# Patient Record
Sex: MALE | Race: OTHER | URBAN - METROPOLITAN AREA
[De-identification: names, ages, dates, MRNs, and addresses within clinical notes are randomized per-mention and may not be internally consistent; named-entity substitution may affect disease eponyms.]

---

## 2024-03-28 ENCOUNTER — OFFICE VISIT (OUTPATIENT)
Dept: SURGERY | Facility: CLINIC | Age: 49
End: 2024-03-28

## 2024-03-28 VITALS — HEART RATE: 62 BPM | SYSTOLIC BLOOD PRESSURE: 131 MMHG | DIASTOLIC BLOOD PRESSURE: 83 MMHG

## 2024-03-28 DIAGNOSIS — N40.1 BENIGN PROSTATIC HYPERPLASIA WITH WEAK URINARY STREAM: ICD-10-CM

## 2024-03-28 DIAGNOSIS — R31.0 GROSS HEMATURIA: ICD-10-CM

## 2024-03-28 DIAGNOSIS — R39.12 BENIGN PROSTATIC HYPERPLASIA WITH WEAK URINARY STREAM: ICD-10-CM

## 2024-03-28 DIAGNOSIS — R82.90 FOUL SMELLING URINE: ICD-10-CM

## 2024-03-28 DIAGNOSIS — R35.1 BENIGN PROSTATIC HYPERPLASIA WITH NOCTURIA: ICD-10-CM

## 2024-03-28 DIAGNOSIS — R35.0 URINARY FREQUENCY: ICD-10-CM

## 2024-03-28 DIAGNOSIS — Z80.42 FAMILY HISTORY OF PROSTATE CANCER IN FATHER: ICD-10-CM

## 2024-03-28 DIAGNOSIS — R30.0 DYSURIA: Primary | ICD-10-CM

## 2024-03-28 DIAGNOSIS — N40.1 BENIGN PROSTATIC HYPERPLASIA WITH NOCTURIA: ICD-10-CM

## 2024-03-28 DIAGNOSIS — Z80.42 FAMILY HISTORY OF PROSTATE CANCER: ICD-10-CM

## 2024-03-28 LAB
BILIRUB UR QL: NEGATIVE
COLOR UR: YELLOW
GLUCOSE UR-MCNC: NORMAL MG/DL
KETONES UR-MCNC: NEGATIVE MG/DL
LEUKOCYTE ESTERASE UR QL STRIP.AUTO: 500
NITRITE UR QL STRIP.AUTO: NEGATIVE
PH UR: 6 [PH] (ref 5–8)
PROT UR-MCNC: 100 MG/DL
RBC #/AREA URNS AUTO: >10 /HPF
SP GR UR STRIP: >1.03 (ref 1–1.03)
UROBILINOGEN UR STRIP-ACNC: NORMAL
WBC #/AREA URNS AUTO: >50 /HPF
WBC CLUMPS UR QL AUTO: PRESENT /HPF

## 2024-03-28 PROCEDURE — 99205 OFFICE O/P NEW HI 60 MIN: CPT | Performed by: PHYSICIAN ASSISTANT

## 2024-03-28 RX ORDER — LEVOTHYROXINE SODIUM 0.03 MG/1
25 TABLET ORAL DAILY
COMMUNITY
Start: 2023-08-25

## 2024-03-28 RX ORDER — TAMSULOSIN HYDROCHLORIDE 0.4 MG/1
0.4 CAPSULE ORAL DAILY
Qty: 90 CAPSULE | Refills: 3 | Status: SHIPPED | OUTPATIENT
Start: 2024-03-28 | End: 2024-03-28

## 2024-03-28 RX ORDER — LISINOPRIL 20 MG/1
20 TABLET ORAL DAILY
COMMUNITY
Start: 2023-11-28

## 2024-03-28 RX ORDER — ROSUVASTATIN CALCIUM 20 MG/1
20 TABLET, COATED ORAL NIGHTLY
COMMUNITY
Start: 2023-11-28

## 2024-03-28 RX ORDER — TAMSULOSIN HYDROCHLORIDE 0.4 MG/1
0.4 CAPSULE ORAL DAILY
Qty: 90 CAPSULE | Refills: 0 | Status: SHIPPED | OUTPATIENT
Start: 2024-03-28

## 2024-03-28 NOTE — PROGRESS NOTES
Subjective:     Patient ID: Noel Jules is a 48 year old male.    Urinary Frequency   Associated symptoms include frequency.     Mr. Jules is a 48 year old  male who presents to clinic today at the request of Dr. Currie for noctura.  needed for visit.   The patient reports that he has had urinary incontinence. Urinary frequency. Penile pain when he urinates.  The patient reports he needs to urinate every hour. He works in hot conditions. He drinks lots of water.   The patient reports he will occasionally have accidents. He reports that he has to strain to urinate.   No fever, no chills.   He reports 3 months ago he had gross hematuria. He reports currently urine is orange in color and has a foul smell.   The patient has not had a prostate exam completed.   IPSS score is 22 (5/4/-/5/-/5/3)  QOL is 6, terrible.   Father had history of prostate cancer, he was 70 when they found it had advanced and it was in the bones.     The patient is a social smoker.   The patient drinks alcohol, started drinking at 19. 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   No history of chemical exposure.       History/Other:   Review of Systems   Genitourinary:  Positive for frequency.   A comprehensive 10 point review of systems was completed.  Pertinent positives and negatives noted in the the HPI.   Current Outpatient Medications   Medication Sig Dispense Refill    rosuvastatin 20 MG Oral Tab Take 1 tablet (20 mg total) by mouth nightly.      metFORMIN 500 MG Oral Tab Take 1 tablet (500 mg total) by mouth 2 (two) times daily with meals.      lisinopril 20 MG Oral Tab Take 1 tablet (20 mg total) by mouth daily.      levothyroxine 25 MCG Oral Tab Take 1 tablet (25 mcg total) by mouth daily.      tamsulosin 0.4 MG Oral Cap Take 1 capsule (0.4 mg total) by mouth daily. 90 capsule 3     Allergies:Not on File    No past medical history on file.   No past surgical history on  file.   No family history on file.   Social History:   Social History     Socioeconomic History    Marital status: Single        Objective:   Physical Exam  Constitutional:       Appearance: Normal appearance.   HENT:      Head: Normocephalic and atraumatic.   Eyes:      General: No scleral icterus.     Conjunctiva/sclera: Conjunctivae normal.   Abdominal:      General: There is no distension.      Palpations: Abdomen is soft.      Tenderness: There is no abdominal tenderness. There is no right CVA tenderness or left CVA tenderness.   Genitourinary:     Prostate: Normal.      Comments: PVR: 114 mL   Musculoskeletal:         General: Normal range of motion.   Skin:     General: Skin is warm and dry.   Neurological:      Mental Status: He is alert and oriented to person, place, and time.   Psychiatric:         Mood and Affect: Mood normal.         Behavior: Behavior normal.         Assessment & Plan:   1. Dysuria    2. Gross hematuria    3. Urinary frequency    4. Foul smelling urine    5. Family history of prostate cancer    6. Family history of prostate cancer in father      1) Dysuria   - will send off UA and urine culture   2) Gross hematuria   I had a lengthy discussion with Noel Jules regarding the finding of gross hematuria.  We went over the relevant anatomy of the  tract as well as the different possible etiologies and differential diagnoses, including benign causes such as infections, stones, recent instrumentation of the genitourinary tract, or benign enlargement of the prostate (in males).  We also discussed more serious potential causes including malignancy or tumors in the upper or lower urinary tracts.    I then discussed the proposed workup for gross hematuria.  This includes a voided urine sample for cytology, imaging in the form of a CT-Urogram to evaluate the upper urinary tracts, as well as a cystoscopy to evaluate the bladder and urethra.    Further management and recommendations will be  based on the results of the workup as outlined above. The patient wishes to proceed with the workup as discussed.  They asked appropriate questions all of which were answered to their satisfaction.     - Urine cytology, CT urogram and Cytoscopy need to be completed.     3) Urinary frequency/ BPH  - could be related to BPH will start with a trial of tamsulosin     4) Family history of prostate cancer   - PSA screen.     Patient will follow up with us for cystoscopy and discuss results.     Orders Placed This Encounter   Procedures    Urinalysis, Routine    PSA Screen [E]    Cytology, fluids    Urine Culture, Routine       Meds This Visit:  Requested Prescriptions     Signed Prescriptions Disp Refills    tamsulosin 0.4 MG Oral Cap 90 capsule 3     Sig: Take 1 capsule (0.4 mg total) by mouth daily.       Imaging & Referrals:  CT UROGRAM(W+WO) W/3D(CPT=74178/60879)     Mikhail Jeffery PA-C   March 28, 2024

## 2024-03-29 ENCOUNTER — TELEPHONE (OUTPATIENT)
Dept: SURGERY | Facility: CLINIC | Age: 49
End: 2024-03-29

## 2024-03-29 LAB — NON GYNE INTERPRETATION: NEGATIVE

## 2024-03-29 RX ORDER — NITROFURANTOIN 25; 75 MG/1; MG/1
100 CAPSULE ORAL 2 TIMES DAILY
Qty: 14 CAPSULE | Refills: 0 | Status: SHIPPED | OUTPATIENT
Start: 2024-03-29 | End: 2024-04-05

## 2024-03-29 RX ORDER — NITROFURANTOIN 25; 75 MG/1; MG/1
100 CAPSULE ORAL 2 TIMES DAILY
Qty: 14 CAPSULE | Refills: 0 | Status: SHIPPED | OUTPATIENT
Start: 2024-03-29 | End: 2024-03-29

## 2024-03-29 NOTE — TELEPHONE ENCOUNTER
----- Message from Mikhail Jeffery PA-C sent at 3/29/2024  2:20 PM CDT -----  Can we please notify the patient that his urine culture came back positive for bacteria. I have called in antibiotics.   His urine cytology is negative. No signs of cancer cells floating in urine.   Continue with plans as we discussed.    Thank you,   Mikhail

## 2024-03-29 NOTE — TELEPHONE ENCOUNTER
HEATHER with . Then called his daughter Cindy, who is his emergency contact. I gave her Amal's message and she asked me to change the pharmacy to [place] and remove [place].    I told Cindy that I would change the prescription and also let her know that her father has an appointment for a cystoscopy in April.    Cindy verbalized her understanding.

## 2024-03-29 NOTE — TELEPHONE ENCOUNTER
Mikhail sent below message.     Please avise.         Mikhail Jeffery PA-C  3/29/2024  2:20 PM CDT       Can we please notify the patient that his urine culture came back positive for bacteria. I have called in antibiotics.  His urine cytology is negative. No signs of cancer cells floating in urine.  Continue with plans as we discussed.     Thank you,  Mikhail

## 2024-04-19 ENCOUNTER — TELEPHONE (OUTPATIENT)
Dept: SURGERY | Facility: CLINIC | Age: 49
End: 2024-04-19

## 2024-04-19 ENCOUNTER — PROCEDURE (OUTPATIENT)
Dept: SURGERY | Facility: CLINIC | Age: 49
End: 2024-04-19

## 2024-04-19 VITALS — DIASTOLIC BLOOD PRESSURE: 91 MMHG | HEART RATE: 55 BPM | SYSTOLIC BLOOD PRESSURE: 150 MMHG

## 2024-04-19 DIAGNOSIS — R31.0 GROSS HEMATURIA: ICD-10-CM

## 2024-04-19 DIAGNOSIS — R35.0 URINARY FREQUENCY: ICD-10-CM

## 2024-04-19 DIAGNOSIS — N47.1 PHIMOSIS: Primary | ICD-10-CM

## 2024-04-19 DIAGNOSIS — R30.0 DYSURIA: Primary | ICD-10-CM

## 2024-04-19 DIAGNOSIS — R82.90 FOUL SMELLING URINE: ICD-10-CM

## 2024-04-19 PROCEDURE — 99213 OFFICE O/P EST LOW 20 MIN: CPT | Performed by: UROLOGY

## 2024-04-19 NOTE — PROGRESS NOTES
Noel Jules is a 48 year old male.    HPI:   No chief complaint on file.      48-year-old male seen by physician assistant Mikhail 2024 for multiple urologic complaints including nocturia, incontinence, urinary frequency, penile pain when he urinates, discomfort when he tries to have intercourse.  Evaluation included noting that he had gross hematuria.  He was referred for his urine cytology which was negative.  Urine culture showed Aerococcus and he was treated appropriately with oral antibiotics.  A CT urogram was ordered but not yet done.  I stressed the importance of obtaining this as soon as possible and he understands.  He presents today for office cystoscopy to complete his workup.  No new reported complaints.      HISTORY:  No past medical history on file.   No past surgical history on file.   No family history on file.   Social History:   Social History     Socioeconomic History    Marital status: Single   Tobacco Use    Smoking status: Some Days     Types: Cigarettes     Social Determinants of Health     Financial Resource Strain: Not on File (10/6/2022)    Received from [facility]     Financial Resource Strain     Financial Resource Strain: 0   Food Insecurity: Not on File (10/6/2022)    Received from [facility]     Food Insecurity     Food: 0   Transportation Needs: Not on File (10/6/2022)    Received from [facility]     Transportation Needs     Transportation: 0   Physical Activity: Not on File (10/6/2022)    Received from [facility]     Physical Activity     Physical Activity: 0   Stress: Not on File (10/6/2022)    Received from [facility]     Stress     Stress: 0   Social Connections: Not on File (10/6/2022)    Received from [facility]     Social Connections     Social Connections and Isolation: 0   Housing Stability: Not on File (10/6/2022)    Received from [facility]     Housing Stability     Housin        Medications (Active prior to today's visit):  Current  Outpatient Medications   Medication Sig Dispense Refill    rosuvastatin 20 MG Oral Tab Take 1 tablet (20 mg total) by mouth nightly.      metFORMIN 500 MG Oral Tab Take 1 tablet (500 mg total) by mouth 2 (two) times daily with meals.      lisinopril 20 MG Oral Tab Take 1 tablet (20 mg total) by mouth daily.      levothyroxine 25 MCG Oral Tab Take 1 tablet (25 mcg total) by mouth daily.      tamsulosin 0.4 MG Oral Cap Take 1 capsule (0.4 mg total) by mouth daily. 90 capsule 0       Allergies:  Not on File      ROS:       PHYSICAL EXAM:   On physical exam the patient has severe phimosis and a pinpoint opening to his foreskin.  This is likely the etiology of the patient's symptoms as described above.     ASSESSMENT/PLAN:   Assessment   Encounter Diagnoses   Name Primary?    Dysuria Yes    Gross hematuria     Urinary frequency     Foul smelling urine        Recommend:  - Discussed findings with the patient.  Recommended circumcision and cystoscopy under anesthesia to rule out any urethral or bladder abnormalities.  I stressed the importance of completing a CT urogram as discussed.  The patient will be contacted by my office to schedule.  Risks and possible side effects were reviewed with him.         Orders This Visit:  No orders of the defined types were placed in this encounter.      Meds This Visit:  Requested Prescriptions      No prescriptions requested or ordered in this encounter       Imaging & Referrals:  None     4/19/2024  Gardenia Bragg MD

## 2024-04-19 NOTE — TELEPHONE ENCOUNTER
Jus Ludwig,  Please schedule this patient as follows:  Diagnosis: Phimosis, gross hematuria  Procedure: Circumcision, cystoscopy, possible biopsy  Site: [facility]  Time: 1 hour  Anesthesia: General  Antibiotics on-call to the operating room: Ancef 2 g IV on-call to the OR  Preoperative labs: CBC, BMP, urine culture, EKG

## 2024-04-19 NOTE — PROGRESS NOTES
-Pt given printout of CT Urogram order; highlighted phone number to schedule test & Call-Center number to contact Urology, if he has additional questions.   -He is aware imaging needs to be completed prior to surgery.  -Encounter complete.

## 2024-04-19 NOTE — TELEPHONE ENCOUNTER
Spoke with patient, scheduled Circumcision, cystoscopy, possible biopsy, Wednesday 05/15/2024, went over pre-op instructions, will mail to patient' home, once received patient will call office, please transfer call to [phone].

## 2024-04-26 ENCOUNTER — TELEPHONE (OUTPATIENT)
Dept: SURGERY | Facility: CLINIC | Age: 49
End: 2024-04-26

## 2024-04-26 NOTE — TELEPHONE ENCOUNTER
Patient is scheduled for CT urogram at [facility] on 5/6 and prior auth is needed, please call [phone] for any questions. Thank you.

## 2024-04-26 NOTE — TELEPHONE ENCOUNTER
Spoke with patient access representative and informed him that our department is only able to obtain prior authorization for this institution. I advised him that if patient is scheduled with [facility] our managed care department will obtain prior auth if needed.

## 2024-05-14 NOTE — DISCHARGE INSTRUCTIONS
No sexual activity for 1 month.  Please make an appointment to see me in the office in 1 month  Please apply Neosporin or bacitracin or triple antibiotic ointment to the incision starting tomorrow for 6 days  No swimming or bathing for at least 4 weeks  May shower tomorrow     CIRUGIA AMBULATORIA: INSTRUCCIONES DESPUES DE KENDRA RECIBIDO ANESTESIA  Debido a la Anestesia y a las medicamentos que se le aplicaron grabiel la cirugia, cesar reflejos y capacidaddiscernimiento pueden verse afectados. Tambien podria tener un poco de mareo.Aparte de siguir las precauciones de sentico comun, le recomendamos lo siguiente:   El paciente debe estar acompañado por alguien hasta la mañana siguiente.     No maneje ningun vehiculo automotor ni monte bicicleta.     No tome ninguna decision importante stefan por ejemplo firmar de documentos importantes.     No opere herramientas electricas ni electrodomesticos, tales stefan cuchillos electricos, batidoras electricas o serruchos electricos. No zaynab el cesped con cortadoras electricas. No practique deportes.     No paco ejercicio.     Para evitar las nauseas, coma menos de lo normal mas o menos la mitad de lo habitual y/o silverio solo liquidos hasta la manana siguiente. Consulte con sylvester doctor si esta llevando ricky dieta especial.     No tome bebidas alcoholicas, tranquilizantes, pastilles para dormir etc., y verifique con sylvester doctor acerca de cualquier medicamento que an tomando actualmente.     El efecto de la medicación usada en sylvester anestesia habra pasado richie por completo a la medianoche. Por lo tanto, puede reanudar cesar habitos cotidianos en la mañana.     Los adultos deben descansar lo yung posible por las siguientes 24 horas. Los niños deben permanecer en cama lo yung posible por las siguientes 24 horas.     Si se presenta cualquier problema, puede llamar a sylvester propio doctor personal o aceda al centro de [facility].    Si sigue estas instrucciones, se  sentira major y estara mas seguro despues de sylvester cirugia ambulatoria. Sitiene cualquier pregunta, llame al hospital y pida que lo comuniquen con la enfermera de cirugia [phone], extension [phone].    Instrucciones para el ingrid: después de sylvester cirugía   Acaba de someterse a ricky cirugía. Grabiel la cirugía, le administraron un tipo de medicamento llamado anestesia para que esté relajado y no sienta dolor. Después de la cirugía, waylon vez sienta algo de dolor o náuseas. [place] es común. Estos son algunos consejos para sentirse mejor y recuperarse oliver después de la cirugía.   El regreso a casa  Sylvester proveedor de atención médica le enseñará cómo cuidarse cuando regrese a sylvester casa. También responderá cesar preguntas. Pida a un familiar o amigo adulto que lo conduzca a sylvester casa. Grabiel las primeras 24 horas después de la cirugía, siga estas recomendaciones:   No conduzca ni use maquinaria pesada.  No tome decisiones importantes ni firme ningún documento legal.  Adminístrese los medicamentos según las indicaciones.  Evite el consumo de alcohol.  Si es necesario, coordine para que alguien se quede con usted. Esta persona puede vigilar cualquier problema que se presente y lo ayudará a permanecer seguro.  Asegúrese de asistir a todas cesar visitas de control con sylvester proveedor de atención médica. Y descanse después de la cirugía grabiel el tiempo que le indique sylvester proveedor.   Cómo sobrellevar el dolor  Si siente dolor después de la cirugía, los analgésicos lo ayudarán a sentirse mejor. [place] los analgésicos según las indicaciones, antes de que el dolor se intensifique. Además, pregunte a sylvester proveedor de atención médica o al farmacéutico acerca de otras formas de controlar el dolor. Estas podrían incluir aplicar calor o hielo, o hacer ejercicios de relajación. Y siga todas las instrucciones que le dé sylvester cirujano o enfermero.      Cumpla el cronograma de cesar medicamentos.     Consejos para rojas analgésicos  Para aliviar el  dolor lo lee posible, recuerde estos puntos:   Los analgésicos pueden causar malestar estomacal. Tomarlos con un poco de comida puede aliviar viktoria efecto.  La mayoría de los calmantes que se serge por la boca necesitan por lo menos de 20 a 30 minutos para surtir efecto.  No espere hasta que sylvester dolor se vuelva intenso para florencio el analgésico que le indicaron. Intente que el momento en que puede florencio sylvester medicamento coincida con otra actividad. Viktoria podría ser el momento antes de vestirse, lin un paseo o sentarse a la lyman para cenar.  El estreñimiento es un efecto secundario frecuente de algunos analgésicos. Consulte a sylvester proveedor de atención médica antes de usar cualquier medicamento, stefan laxantes o ablandadores de heces, para ayudar a aliviar el estreñimiento. También consulte si es preciso evitar algún tipo de alimento. Florencio mucha cantidad de líquido y comer alimentos stefan frutas y verduras con alto contenido de fibra también puede ser beneficioso. Recuerde que no debe florencio laxantes a menos que sylvester cirujano se los indique.  Mezclar bebidas alcohólicas y analgésicos puede causar mareos y enlentecer sylvester respiración. Y hasta puede ser mortal. No silverio alcohol mientras esté tomando calmantes.  Los analgésicos pueden hacer que tenga reacciones más lentas. No conduzca ni opere maquinaria mientras esté tomando analgésicos.  Sylvester proveedor de atención médica puede indicarle que tome acetaminofén (paracetamol) para ayudar a aliviar el dolor. Pregúntele qué cantidad debe florencio por día. El acetaminofén y otros analgésicos pueden interactuar con cesar medicamentos recetados u otros medicamentos de venta sterling (OTC, por cesar siglas en inglés). Algunos medicamentos recetados contienen acetaminofén y otros ingredientes. Combinar medicamentos recetados y acetaminofén de venta sterling para aliviar el dolor puede provocarle ricky sobredosis accidental. Erica atentamente la etiqueta del envase de cesar medicamentos OTC. [place] lo ayudará a  saber con exactitud la lista de ingredientes, la cantidad que debe rojas y cualquier advertencia. [place] también puede ayudarlo a evitar rojas demasiado acetaminofén. Si tiene preguntas o no entiende la información, pídale a sylvester farmacéutico o proveedor de atención médica que se la explique antes de rojas el medicamento OTC.   Manejo de las náuseas  Algunas personas pueden sentir malestar estomacal (náuseas) después de la cirugía. [place] suele suceder debido a la anestesia, el dolor, los analgésicos, la disminución del movimiento de la comida en el estómago o el estrés de la cirugía. Estos consejos lo ayudarán a manejar las náuseas y a comer alimentos más saludables mientras se recupera. Si seguía un plan alimentario especial antes de la cirugía, pregúntele a sylvester proveedor de atención médica si debe continuarlo mientras se recupera. Consulte con sylvester proveedor cómo debería continuar sylvester alimentación. Esta puede variar según el tipo de cirugía a la que se sometió. Los siguientes consejos generales pueden serle útiles:   No se fuerce a comer. [place] por sylvester cuerpo para saber cuándo comer y qué cantidad.  Comience con líquidos transparentes y sopa. Estos son más fáciles de digerir.  Tan pronto stefan se sienta listo, intente comer alimentos semisólidos. Estos incluyen puré de kavya, puré de manzana y gelatina.  Lentamente, pase a alimentos sólidos. Al principio no coma alimentos grasosos, pesados ni condimentados.  No se fuerce a hacer neo comidas grandes al día. En cambio, coma cantidades pequeñas, jovani con mayor frecuencia.  [place] los analgésicos con ricky pequeña cantidad de alimentos sólidos, stefan galletas saladas o ricky tostada. [place] ayuda a prevenir las náuseas.  Cuándo llamar a sylvester proveedor de atención médica   Llame de inmediato a sylvester proveedor de atención médica si nota alguno de los siguientes síntomas:   Sigue teniendo mucho dolor, o el dolor empeora, después de rojas el medicamento. Puede que el medicamento no sea lo  suficientemente lety. O oliver, puede lan complicaciones de la cirugía.  Se siente demasiado somnoliento, mareado o adormecido. Quizás el medicamento sea demasiado lety.  Tiene efectos secundarios, stefan náuseas o vómitos. Sylvester proveedor de atención médica puede recomendarle rojas otros medicamentos.  Tiene cambios en la piel, stefan sarpullido, picazón o urticaria. [place] puede significar que tiene ricky reacción alérgica. Sylvester proveedor puede recomendarle rojas otros medicamentos.  La incisión tiene un aspecto diferente (por ejemplo, se abre ricky parte).  Tiene sangrado o supuración de líquido de la herida y no le dijeron que eso era esperable.  Fiebre de 100.4 °F (38 °C) o más, o según le indique sylvester proveedor.  Cuándo llamar al 911  Llame al  911  de inmediato si tiene:   Dificultad para respirar  Melissa hinchada    Si tiene apnea del sueño obstructiva   N[place]av la cirugía, le administraron anestesia para que esté cómodo y no sienta dolor. Después de la cirugía, es probable que tenga más ataques de apnea causados por la anestesia y otros medicamentos que le administraron. Los ataques pueden durar más de lo habitual.    En sylvester casa, paco lo siguiente:  Cuando duerma, siga usando sylvester dispositivo de presión positiva continua en las vías respiratorias (CPAP, por cesar siglas en inglés). A menos que sylvester proveedor de atención médica le indique lo contrario, úselo siempre que duerma, ya sea de día o de noche. El dispositivo de CPAP suele usarse para tratar la apnea obstructiva del sueño.  Consulte a sylvester proveedor antes de rojas cualquier analgésico, relajante muscular o sedante. Sylvester proveedor le dará información sobre los peligros de rojas estos medicamentos.  Comuníquese con sylvester proveedor si tiene el sueño demasiado alterado, incluso cuando esté tomando los medicamentos según las instrucciones.  © 0427-8186 [employer]. Todos los derechos reservados. Esta información no pretende sustituir la atención médica profesional.  Sólo sylvester médico puede diagnosticar y tratar un problema de livier.

## 2024-05-15 NOTE — OPERATIVE REPORT
[facility]  part of [facility]    Operative Note     Noel Denis Location: OR   CSN [id number] MRN [id number]   Admission Date 5/15/2024 Operation Date 5/15/2024   Attending Physician Gardenia Bragg MD Operating Physician Gardenia Bragg MD      Preoperative Diagnosis: Phimosis [N47.1]  Gross hematuria [R31.0]     Postoperative Diagnosis: Phimosis [N47.1]Gross hematuria [R31.0]     Procedure Performed:   Circumcision, cystoscopy, possible biopsy     Primary Surgeon: Gardenia Bragg MD      Assistant: None     Surgical Findings: Severe phimosis with the inner prep use essentially adherent completely to the glans of the penis.  Most of it had to be dissected off the glans penis to expose the glans fully.  Unremarkable cystourethroscopy     Anesthesia: General     Complications: None     Implants: * No implants in log *     Specimen: Foreskin to pathology     Drains: None     Condition: Stable after consent was obtained and preoperative antibiotics administered he was brought into the operating room.  Anesthesia was administered and he was placed in the supine position.     Estimated Blood Loss: Blood Output: 10 mL (5/15/2024 12:23 PM)       Summary of Case: The groin was prepped and draped in the usual sterile standard fashion.  I made a dorsal slit to expose the glans penis.  There is severe phimosis and the inner prep use was completely adherent to the glans penis and had to be dissected.  A combination of lubricated forceps, lacrimal probes and Metzenbaum scissors were used to dissect the inner prep use off of the glans penis surface.  Once this was completely exposed the redundant foreskin was incised and removed.  Hemostasis was established.  The edge approximately of the penile skin was sutured using 3-0 Vicryl's to the area just proximal to the coronal sulcus.  Some of that skin was removed so it should reepithelialized with time.  No bleeding was seen.  At this point a flexible  cystoscope was placed per urethra and a cystourethroscopy was done.  No significant abnormalities of the urethra or bladder were detected.  Bacitracin ointment and 4 x 4's were placed as well as an athletic supporter.  He was awakened extubated and taken out to the postanesthesia care in stable condition.  I was present and performed the entirety of his procedure.  There were no perioperative or immediate postop complications.     Gardenia Bragg MD  5/15/2024  12:27 PM

## 2024-05-15 NOTE — INTERVAL H&P NOTE
Pre-op Diagnosis: Phimosis [N47.1]  Gross hematuria [R31.0]    The above referenced H&P was reviewed by Gardenia Bragg MD on 5/15/2024, the patient was examined and no significant changes have occurred in the patient's condition since the H&P was performed.  I discussed with the patient and/or legal representative the potential benefits, risks and side effects of this procedure; the likelihood of the patient achieving goals; and potential problems that might occur during recuperation.  I discussed reasonable alternatives to the procedure, including risks, benefits and side effects related to the alternatives and risks related to not receiving this procedure.  We will proceed with procedure as planned.

## 2024-05-15 NOTE — H&P
Expand All Collapse All    Noel Jules is a 48 year old male.     HPI:   No chief complaint on file.        48-year-old male seen by physician assistant Mikhail March 28, 2024 for multiple urologic complaints including nocturia, incontinence, urinary frequency, penile pain when he urinates, discomfort when he tries to have intercourse.  Evaluation included noting that he had gross hematuria.  He was referred for his urine cytology which was negative.  Urine culture showed Aerococcus and he was treated appropriately with oral antibiotics.  A CT urogram was ordered but not yet done.  I stressed the importance of obtaining this as soon as possible and he understands.  He presents today for office cystoscopy to complete his workup.  No new reported complaints.        HISTORY:  Past Medical History   No past medical history on file.      Past Surgical History   No past surgical history on file.      Family History   No family history on file.      Social History:   Short Social Hx on File   Social History            Socioeconomic History    Marital status: Single   Tobacco Use    Smoking status: Some Days       Types: Cigarettes      Social Determinants of Health           Financial Resource Strain: Not on File (10/6/2022)     Received from [facility]      Financial Resource Strain      Financial Resource Strain: 0   Food Insecurity: Not on File (10/6/2022)     Received from [facility]      Food Insecurity      Food: 0   Transportation Needs: Not on File (10/6/2022)     Received from [facility]      Transportation Needs      Transportation: 0   Physical Activity: Not on File (10/6/2022)     Received from [facility]      Physical Activity      Physical Activity: 0   Stress: Not on File (10/6/2022)     Received from [facility]      Stress      Stress: 0   Social Connections: Not on File (10/6/2022)     Received from [facility]      Social Connections      Social Connections and Isolation: 0   Housing  Stability: Not on File (10/6/2022)     Received from [facility]      [facility] Stability      Housin            Medications (Active prior to today's visit):  Current Medications          Current Outpatient Medications   Medication Sig Dispense Refill    rosuvastatin 20 MG Oral Tab Take 1 tablet (20 mg total) by mouth nightly.        metFORMIN 500 MG Oral Tab Take 1 tablet (500 mg total) by mouth 2 (two) times daily with meals.        lisinopril 20 MG Oral Tab Take 1 tablet (20 mg total) by mouth daily.        levothyroxine 25 MCG Oral Tab Take 1 tablet (25 mcg total) by mouth daily.        tamsulosin 0.4 MG Oral Cap Take 1 capsule (0.4 mg total) by mouth daily. 90 capsule 0            Allergies:  Allergies   Not on File           ROS:   Reviewed and normal     PHYSICAL EXAM:   On physical exam the patient has severe phimosis and a pinpoint opening to his foreskin.  This is likely the etiology of the patient's symptoms as described above.  In no apparent distress  Intact neurologically grossly  Abdomen soft nontender nondistended  Alert and oriented x 3  Phallus and testicle exams unremarkable aside from moderate to severe phimosis.  ASSESSMENT/PLAN:      Assessment       Encounter Diagnoses   Name Primary?    Dysuria Yes    Gross hematuria      Urinary frequency      Foul smelling urine           Recommend:  - Discussed findings with the patient.  Recommended circumcision and cystoscopy under anesthesia to rule out any urethral or bladder abnormalities.  I stressed the importance of completing a CT urogram as discussed.  The patient will be contacted by my office to schedule.  Risks and possible side effects were reviewed with him.              Orders This Visit:  No orders of the defined types were placed in this encounter.        Meds This Visit:  Requested Prescriptions

## 2024-05-15 NOTE — ANESTHESIA PROCEDURE NOTES
Airway  Date/Time: 5/15/2024 11:08 AM  Urgency: elective    Airway not difficult    General Information and Staff    Patient location during procedure: OR  Anesthesiologist: Kathy Fonseca MD  Performed: anesthesiologist   Performed by: Kathy Fonseca MD  Authorized by: Kathy Fonseca MD      Indications and Patient Condition  Indications for airway management: anesthesia  Spontaneous Ventilation: absent  Sedation level: deep  Preoxygenated: yes  Patient position: sniffing  Mask difficulty assessment: 1 - vent by mask  Planned trial extubation    Final Airway Details  Final airway type: supraglottic airway      Successful airway: classic  Size 4       Number of attempts at approach: 1  Number of other approaches attempted: 0

## 2024-05-15 NOTE — ANESTHESIA POSTPROCEDURE EVALUATION
Patient: Noel Denis    Procedure Summary       Date: 05/15/24 Room / Location: [facility] MAIN OR  / [facility] MAIN OR    Anesthesia Start: 1100 Anesthesia Stop: 1250    Procedures:       Circumcision, cystoscopy (Penis)      URINARY BIOPSY (Urethra)      CYSTOSCOPY (Urethra) Diagnosis:       Phimosis      Gross hematuria      (Phimosis [N47.1]Gross hematuria [R31.0])    Surgeons: Gardenia Bragg MD Anesthesiologist: Kathy Fonseca MD    Anesthesia Type: general ASA Status: 2            Anesthesia Type: general    Vitals Value Taken Time   /92 05/15/24 1317   Temp 99.1 °F (37.3 °C) 05/15/24 1247   Pulse 71 05/15/24 1319   Resp 12 05/15/24 1319   SpO2 91 % 05/15/24 1319   Vitals shown include unfiled device data.    [facility] AN Post Evaluation:   Patient Evaluated in PACU  Patient Participation: complete - patient participated  Level of Consciousness: awake and alert  Pain Score: 0  Pain Management: adequate  Airway Patency:patent  Dental exam unchanged from preop  Yes    Nausea/Vomiting: none  Cardiovascular Status: hemodynamically stable  Respiratory Status: spontaneous ventilation and nasal cannula  Postoperative Hydration balanced      Kathy Fonseca MD  5/15/2024 1:20 PM

## 2024-05-15 NOTE — ANESTHESIA PREPROCEDURE EVALUATION
Anesthesia PreOp Note    HPI:     Noel Denis is a 49 year old male who presents for preoperative consultation requested by: Gardenia Bragg MD    Date of Surgery: 5/15/2024    Procedure(s):  Circumcision, cystoscopy, possible biopsy  URINARY BIOPSY  CYSTOSCOPY  Indication: Phimosis [N47.1]  Gross hematuria [R31.0]    Relevant Problems   No relevant active problems       NPO:  Last Liquid Consumption Date: 24  Last Liquid Consumption Time:   Last Solid Consumption Date: 24  Last Solid Consumption Time:   Last Liquid Consumption Date: 24          History Review:  There are no problems to display for this patient.      Past Medical History:    Diabetes (HCC)    Disorder of thyroid    High blood pressure    High cholesterol    Visual impairment       Past Surgical History:   Procedure Laterality Date    Appendectomy      Bowel resection      Umbilical hernia repair         Medications Prior to Admission   Medication Sig Dispense Refill Last Dose    rosuvastatin 20 MG Oral Tab Take 1 tablet (20 mg total) by mouth nightly.   2024    metFORMIN 500 MG Oral Tab Take 1 tablet (500 mg total) by mouth 2 (two) times daily with meals.   2024    lisinopril 20 MG Oral Tab Take 1 tablet (20 mg total) by mouth daily.   2024    levothyroxine 25 MCG Oral Tab Take 1 tablet (25 mcg total) by mouth daily.   2024    tamsulosin 0.4 MG Oral Cap Take 1 capsule (0.4 mg total) by mouth daily. 90 capsule 0 2024    [] nitrofurantoin monohydrate macro (MACROBID) 100 MG Oral Cap Take 1 capsule (100 mg total) by mouth 2 (two) times daily for 14 doses. 14 capsule 0      Current Facility-Administered Medications Ordered in Epic   Medication Dose Route Frequency Provider Last Rate Last Admin    lactated ringers infusion   Intravenous Continuous Gardenia Bragg MD 20 mL/hr at 05/15/24 0915 New Bag at 05/15/24 0915    ceFAZolin (Ancef) 2 g in 20mL IV syringe premix  2 g Intravenous  Once Gardenia Bragg MD         No current [facility]-ordered outpatient medications on file.       No Known Allergies    History reviewed. No pertinent family history.  Social History     Socioeconomic History    Marital status: Single   Tobacco Use    Smoking status: Former     Types: Cigarettes    Smokeless tobacco: Never   Vaping Use    Vaping status: Never Used   Substance and Sexual Activity    Alcohol use: Yes     Comment: SOCIALLY    Drug use: Never       Available pre-op labs reviewed.     Lab Results   Component Value Date    PGLU 110 (H) 05/15/2024          Vital Signs:  Body mass index is 34.01 kg/m².   height is 1.6 m (5' 3\") and weight is 87.1 kg (192 lb). His oral temperature is 98.3 °F (36.8 °C). His blood pressure is 150/92 (abnormal) and his pulse is 73. His respiration is 12 and oxygen saturation is 98%.   Vitals:    05/10/24 1605 05/15/24 0915   BP:  (!) 150/92   Pulse:  73   Resp:  12   Temp:  98.3 °F (36.8 °C)   TempSrc:  Oral   SpO2:  98%   Weight: 86.2 kg (190 lb) 87.1 kg (192 lb)   Height: 1.62 m (5' 3.78\") 1.6 m (5' 3\")        Anesthesia Evaluation      Airway   Mallampati: II  TM distance: >3 FB  Neck ROM: full  Dental - Dentition appears grossly intact     Pulmonary     breath sounds clear to auscultation  Cardiovascular   (+) hypertension    Rhythm: regular  Rate: normal    Neuro/Psych      GI/Hepatic/Renal      Endo/Other    (+) diabetes mellitus  Abdominal   (+) obese    Abdomen: soft.                 Anesthesia Plan:   ASA:  2  Plan:   General  Airway:  ETT and LMA  Post-op Pain Management: IV analgesics  Informed Consent Plan and Risks Discussed With:  Patient  Use of Blood Products Discussed With:  Patient  Discussed plan with:  Surgeon      I have informed Noel Denis and/or legal guardian or family member of the nature of the anesthetic plan, benefits, risks including possible dental damage if relevant, major complications, and any alternative forms of anesthetic  management.   All of the patient's questions were answered to the best of my ability. The patient desires the anesthetic management as planned.  Kathy Fonseca MD  5/15/2024 10:43 AM  Present on Admission:  **None**

## 2024-05-15 NOTE — ANESTHESIA PROCEDURE NOTES
Airway  Date/Time: 5/15/2024 12:09 PM  Urgency: elective    Airway not difficult    General Information and Staff    Patient location during procedure: OR  Anesthesiologist: Kathy Fonseca MD  Performed: anesthesiologist   Performed by: Kathy Fonseca MD  Authorized by: Kathy Fonseca MD      Final Airway Details  Final airway type: endotracheal airway      Successful airway: ETT  Cuffed: yes   Successful intubation technique: Video laryngoscopy  Facilitating devices/methods: intubating stylet  Endotracheal tube insertion site: oral  Blade size: #3  ETT size (mm): 7.5    Cormack-Lehane Classification: grade I - full view of glottis  Placement verified by: capnometry   Cuff volume (mL): 7  Measured from: lips  ETT to lips (cm): 22  Number of attempts at approach: 1  Number of other approaches attempted: 0